# Patient Record
Sex: MALE | URBAN - METROPOLITAN AREA
[De-identification: names, ages, dates, MRNs, and addresses within clinical notes are randomized per-mention and may not be internally consistent; named-entity substitution may affect disease eponyms.]

---

## 2022-10-04 ENCOUNTER — OFFICE VISIT (OUTPATIENT)
Dept: URBAN - METROPOLITAN AREA CLINIC 64 | Facility: CLINIC | Age: 62
End: 2022-10-04
Payer: COMMERCIAL

## 2022-10-04 DIAGNOSIS — H40.013 OPEN ANGLE WITH BORDERLINE FINDINGS, LOW RISK, BILATERAL: Primary | ICD-10-CM

## 2022-10-04 DIAGNOSIS — H11.31 CONJUNCTIVAL HEMORRHAGE, RIGHT EYE: ICD-10-CM

## 2022-10-04 PROCEDURE — 99204 OFFICE O/P NEW MOD 45 MIN: CPT | Performed by: STUDENT IN AN ORGANIZED HEALTH CARE EDUCATION/TRAINING PROGRAM

## 2022-10-04 RX ORDER — NEOMYCIN SULFATE, POLYMYXIN B SULFATE AND DEXAMETHASONE 3.5; 10000; 1 MG/G; [USP'U]/G; MG/G
OINTMENT OPHTHALMIC
Qty: 1 | Refills: 0 | Status: ACTIVE
Start: 2022-10-04

## 2022-10-04 ASSESSMENT — INTRAOCULAR PRESSURE
OS: 10
OD: 9

## 2022-10-04 NOTE — IMPRESSION/PLAN
Impression: Open angle with borderline findings, low risk, bilateral: H40.013. Plan: Based on history. Good IOP today. Recommend baseline glaucoma work up. Monitor.  [facility] VF 24-2/RNFL in 6 months

## 2022-10-04 NOTE — IMPRESSION/PLAN
Impression: Conjunctival hemorrhage, right eye: H11.31.  Plan: Blunt digital trauma
-Start Maxitrol QID OD for one week and then stop

## 2023-04-14 ENCOUNTER — OFFICE VISIT (OUTPATIENT)
Dept: URBAN - METROPOLITAN AREA CLINIC 64 | Facility: CLINIC | Age: 63
End: 2023-04-14
Payer: COMMERCIAL

## 2023-04-14 DIAGNOSIS — H25.13 AGE-RELATED NUCLEAR CATARACT, BILATERAL: ICD-10-CM

## 2023-04-14 DIAGNOSIS — H40.013 OPEN ANGLE WITH BORDERLINE FINDINGS, LOW RISK, BILATERAL: Primary | ICD-10-CM

## 2023-04-14 PROCEDURE — 92133 CPTRZD OPH DX IMG PST SGM ON: CPT | Performed by: STUDENT IN AN ORGANIZED HEALTH CARE EDUCATION/TRAINING PROGRAM

## 2023-04-14 PROCEDURE — 99214 OFFICE O/P EST MOD 30 MIN: CPT | Performed by: STUDENT IN AN ORGANIZED HEALTH CARE EDUCATION/TRAINING PROGRAM

## 2023-04-14 PROCEDURE — 92083 EXTENDED VISUAL FIELD XM: CPT | Performed by: STUDENT IN AN ORGANIZED HEALTH CARE EDUCATION/TRAINING PROGRAM

## 2023-04-14 ASSESSMENT — INTRAOCULAR PRESSURE
OD: 13
OS: 13

## 2023-04-14 NOTE — IMPRESSION/PLAN
Impression: Open angle with borderline findings, low risk, bilateral: H40.013. Plan: No indicators of disease at this time. IOP today normal, optic nerve is abberant, RNFL OCT without thinning OU.  HVF corresponds to area of PPA OU
-Recommend follow up in 1 year for RNFL OCT comparison/HVF 24-2 OU

## 2023-04-14 NOTE — IMPRESSION/PLAN
Impression: Age-related nuclear cataract, bilateral: H25.13. Plan: Patient is bothered by glare and doesnt drive at night anymore. He is requesting a cataract evaluation.  Schedule accordingly

## 2023-05-12 ENCOUNTER — PRE-OPERATIVE VISIT (OUTPATIENT)
Dept: URBAN - METROPOLITAN AREA CLINIC 64 | Facility: CLINIC | Age: 63
End: 2023-05-12
Payer: COMMERCIAL

## 2023-05-12 DIAGNOSIS — H40.013 OPEN ANGLE WITH BORDERLINE FINDINGS, LOW RISK, BILATERAL: ICD-10-CM

## 2023-05-12 DIAGNOSIS — H25.13 AGE-RELATED NUCLEAR CATARACT, BILATERAL: ICD-10-CM

## 2023-05-12 DIAGNOSIS — H35.341 MACULAR CYST, HOLE, OR PSEUDOHOLE, RIGHT EYE: Primary | ICD-10-CM

## 2023-05-12 DIAGNOSIS — H35.373 PUCKERING OF MACULA, BILATERAL: ICD-10-CM

## 2023-05-12 PROCEDURE — 99214 OFFICE O/P EST MOD 30 MIN: CPT | Performed by: STUDENT IN AN ORGANIZED HEALTH CARE EDUCATION/TRAINING PROGRAM

## 2023-05-12 PROCEDURE — 92136 OPHTHALMIC BIOMETRY: CPT | Performed by: STUDENT IN AN ORGANIZED HEALTH CARE EDUCATION/TRAINING PROGRAM

## 2023-05-12 ASSESSMENT — INTRAOCULAR PRESSURE
OD: 15
OS: 11

## 2023-05-12 ASSESSMENT — VISUAL ACUITY
OD: 20/30
OS: 20/25

## 2023-05-12 NOTE — IMPRESSION/PLAN
Impression: Macular cyst, hole, or pseudohole, right eye: H35.341.  Plan: Macular hole, new changes today
-Refer to Retina for possible PPV/MP
discussed with patient cataract will progress after PPV

## 2023-05-12 NOTE — IMPRESSION/PLAN
Impression: Age-related nuclear cataract, bilateral: H25.13.  Plan: HOld off on CEIOL until retina consult

## 2023-05-12 NOTE — IMPRESSION/PLAN
Impression: Open angle with borderline findings, low risk, bilateral: H40.013.  Plan: No evidence of based on testing, monitor, Nerve is myopic in appearance

## 2023-05-12 NOTE — IMPRESSION/PLAN
Impression: Puckering of macula, bilateral: H35.373. Plan: With macular hold OD, trace OS.  refer to retina

## 2023-05-22 ENCOUNTER — OFFICE VISIT (OUTPATIENT)
Dept: URBAN - METROPOLITAN AREA CLINIC 64 | Facility: CLINIC | Age: 63
End: 2023-05-22
Payer: COMMERCIAL

## 2023-05-22 DIAGNOSIS — H35.341 MACULAR CYST, HOLE, OR PSEUDOHOLE, RIGHT EYE: Primary | ICD-10-CM

## 2023-05-22 PROCEDURE — 99214 OFFICE O/P EST MOD 30 MIN: CPT | Performed by: OPHTHALMOLOGY

## 2023-05-22 PROCEDURE — 92134 CPTRZ OPH DX IMG PST SGM RTA: CPT | Performed by: OPHTHALMOLOGY

## 2023-05-22 PROCEDURE — 92250 FUNDUS PHOTOGRAPHY W/I&R: CPT | Performed by: OPHTHALMOLOGY

## 2023-05-22 ASSESSMENT — INTRAOCULAR PRESSURE
OD: 14
OS: 12

## 2023-05-22 NOTE — IMPRESSION/PLAN
Impression: Macular cyst, hole, or pseudohole, right eye: H35.341. Plan: Advanced traction w/ foveal-schisis and decreased Va, recommend PPV - risks and benefits of surgery discussed including loss of vision, loss of eye, need for add'l surgeries, retinal detachment. No guarantee of visual recovery. Pt elects to proceed. Schedule next available PPV/ILM peel/FGx w/ SF6 in [place].

## 2023-05-31 ENCOUNTER — ADULT PHYSICAL (OUTPATIENT)
Dept: URBAN - METROPOLITAN AREA CLINIC 64 | Facility: CLINIC | Age: 63
End: 2023-05-31
Payer: COMMERCIAL

## 2023-05-31 DIAGNOSIS — Z01.818 ENCOUNTER FOR OTHER PREPROCEDURAL EXAMINATION: Primary | ICD-10-CM

## 2023-05-31 DIAGNOSIS — H35.341 MACULAR CYST, HOLE, OR PSEUDOHOLE, RIGHT EYE: ICD-10-CM

## 2023-05-31 PROCEDURE — 99203 OFFICE O/P NEW LOW 30 MIN: CPT | Performed by: PHYSICIAN ASSISTANT

## 2023-06-16 ENCOUNTER — POST-OPERATIVE VISIT (OUTPATIENT)
Dept: URBAN - METROPOLITAN AREA CLINIC 64 | Facility: LOCATION | Age: 63
End: 2023-06-16
Payer: COMMERCIAL

## 2023-06-16 DIAGNOSIS — Z48.810 ENCOUNTER FOR SURGICAL AFTERCARE FOLLOWING SURGERY ON A SENSE ORGAN: Primary | ICD-10-CM

## 2023-06-16 PROCEDURE — 99024 POSTOP FOLLOW-UP VISIT: CPT

## 2023-06-16 RX ORDER — TOBRAMYCIN 3 MG/ML
0.3 % SOLUTION/ DROPS OPHTHALMIC
Qty: 5 | Refills: 0 | Status: ACTIVE
Start: 2023-06-16

## 2023-06-16 ASSESSMENT — INTRAOCULAR PRESSURE: OD: 15

## 2023-06-16 NOTE — IMPRESSION/PLAN
Impression: S/P Posterior Vitrectomy; Epiretinal Membranectomy; AFX (Air Fluid Gas Exchange) OD - 1 Day. Encounter for surgical aftercare following surgery on a sense organ  Z48.810. Plan: Pt ed. Pt is doing well. RTC as scheduled.

## 2023-07-06 ENCOUNTER — OFFICE VISIT (OUTPATIENT)
Dept: URBAN - METROPOLITAN AREA CLINIC 64 | Facility: LOCATION | Age: 63
End: 2023-07-06
Payer: COMMERCIAL

## 2023-07-06 DIAGNOSIS — H35.341 MACULAR CYST, HOLE, OR PSEUDOHOLE, RIGHT EYE: Primary | ICD-10-CM

## 2023-07-06 DIAGNOSIS — Z48.810 ENCOUNTER FOR SURGICAL AFTERCARE FOLLOWING SURGERY ON A SENSE ORGAN: ICD-10-CM

## 2023-07-06 PROCEDURE — 92250 FUNDUS PHOTOGRAPHY W/I&R: CPT | Performed by: OPHTHALMOLOGY

## 2023-07-06 PROCEDURE — 92134 CPTRZ OPH DX IMG PST SGM RTA: CPT | Performed by: OPHTHALMOLOGY

## 2023-07-06 PROCEDURE — 99024 POSTOP FOLLOW-UP VISIT: CPT | Performed by: OPHTHALMOLOGY

## 2023-07-06 ASSESSMENT — INTRAOCULAR PRESSURE
OS: 16
OD: 20

## 2023-07-06 NOTE — IMPRESSION/PLAN
Impression: S/P Posterior Vitrectomy; Epiretinal Membranectomy; AFX (Air Fluid Gas Exchange) OD - 3 weeks. Encounter for surgical aftercare following surgery on a sense organ  Z48.810. Plan: Doing great, residual mild foveal schisis which should improve but EMM resolved. RD precautions. RTC 3 months DFEx/OCT mac/FP.

## 2023-10-09 ENCOUNTER — OFFICE VISIT (OUTPATIENT)
Dept: URBAN - METROPOLITAN AREA CLINIC 64 | Facility: LOCATION | Age: 63
End: 2023-10-09
Payer: COMMERCIAL

## 2023-10-09 DIAGNOSIS — H35.341 MACULAR CYST, HOLE, OR PSEUDOHOLE, RIGHT EYE: Primary | ICD-10-CM

## 2023-10-09 PROCEDURE — 92250 FUNDUS PHOTOGRAPHY W/I&R: CPT | Performed by: OPHTHALMOLOGY

## 2023-10-09 PROCEDURE — 92134 CPTRZ OPH DX IMG PST SGM RTA: CPT | Performed by: OPHTHALMOLOGY

## 2023-10-09 PROCEDURE — 99214 OFFICE O/P EST MOD 30 MIN: CPT | Performed by: OPHTHALMOLOGY

## 2023-10-09 ASSESSMENT — INTRAOCULAR PRESSURE
OS: 14
OD: 13

## 2023-10-16 ENCOUNTER — OFFICE VISIT (OUTPATIENT)
Dept: URBAN - METROPOLITAN AREA CLINIC 64 | Facility: LOCATION | Age: 63
End: 2023-10-16
Payer: COMMERCIAL

## 2023-10-16 DIAGNOSIS — H35.341 MACULAR CYST, HOLE, OR PSEUDOHOLE, RIGHT EYE: ICD-10-CM

## 2023-10-16 DIAGNOSIS — H52.223 REGULAR ASTIGMATISM, BILATERAL: ICD-10-CM

## 2023-10-16 DIAGNOSIS — H25.13 AGE-RELATED NUCLEAR CATARACT, BILATERAL: ICD-10-CM

## 2023-10-16 DIAGNOSIS — H25.813 COMBINED FORMS OF AGE-RELATED CATARACT, BILATERAL: Primary | ICD-10-CM

## 2023-10-16 DIAGNOSIS — H35.372 PUCKERING OF MACULA, LEFT EYE: ICD-10-CM

## 2023-10-16 PROCEDURE — 92136 OPHTHALMIC BIOMETRY: CPT | Performed by: OPHTHALMOLOGY

## 2023-10-16 PROCEDURE — 99214 OFFICE O/P EST MOD 30 MIN: CPT | Performed by: OPHTHALMOLOGY

## 2023-10-16 ASSESSMENT — INTRAOCULAR PRESSURE
OS: 16
OD: 15

## 2023-10-16 ASSESSMENT — VISUAL ACUITY
OD: 20/40
OS: 20/20

## 2023-11-01 ENCOUNTER — ADULT PHYSICAL (OUTPATIENT)
Dept: URBAN - METROPOLITAN AREA CLINIC 64 | Facility: LOCATION | Age: 63
End: 2023-11-01
Payer: COMMERCIAL

## 2023-11-01 DIAGNOSIS — H25.13 AGE-RELATED NUCLEAR CATARACT, BILATERAL: ICD-10-CM

## 2023-11-01 DIAGNOSIS — Z01.818 ENCOUNTER FOR OTHER PREPROCEDURAL EXAMINATION: Primary | ICD-10-CM

## 2023-11-01 PROCEDURE — 99213 OFFICE O/P EST LOW 20 MIN: CPT | Performed by: NURSE PRACTITIONER

## 2023-11-01 RX ORDER — LOSARTAN POTASSIUM 50 MG/1
50 MG TABLET, FILM COATED ORAL
Qty: 0 | Refills: 0 | Status: ACTIVE
Start: 2023-11-01

## 2023-11-07 ENCOUNTER — SURGERY (OUTPATIENT)
Dept: URBAN - METROPOLITAN AREA SURGERY 42 | Facility: LOCATION | Age: 63
End: 2023-11-07
Payer: COMMERCIAL

## 2023-11-07 PROCEDURE — 66984 XCAPSL CTRC RMVL W/O ECP: CPT | Performed by: OPHTHALMOLOGY

## 2023-11-07 PROCEDURE — PR1CP PR1CP: CUSTOM | Performed by: OPHTHALMOLOGY

## 2023-11-07 RX ORDER — DICLOFENAC SODIUM 1 MG/ML
0.1 % SOLUTION/ DROPS OPHTHALMIC
Qty: 5 | Refills: 1 | Status: ACTIVE
Start: 2023-11-07

## 2023-11-08 ENCOUNTER — POST-OPERATIVE VISIT (OUTPATIENT)
Dept: URBAN - METROPOLITAN AREA CLINIC 64 | Facility: LOCATION | Age: 63
End: 2023-11-08
Payer: COMMERCIAL

## 2023-11-08 DIAGNOSIS — Z48.810 ENCOUNTER FOR SURGICAL AFTERCARE FOLLOWING SURGERY ON A SENSE ORGAN: Primary | ICD-10-CM

## 2023-11-08 PROCEDURE — 99024 POSTOP FOLLOW-UP VISIT: CPT

## 2023-11-08 ASSESSMENT — INTRAOCULAR PRESSURE: OD: 15

## 2023-11-28 ENCOUNTER — POST-OPERATIVE VISIT (OUTPATIENT)
Dept: URBAN - METROPOLITAN AREA CLINIC 64 | Facility: LOCATION | Age: 63
End: 2023-11-28
Payer: COMMERCIAL

## 2023-11-28 DIAGNOSIS — Z48.810 ENCOUNTER FOR SURGICAL AFTERCARE FOLLOWING SURGERY ON A SENSE ORGAN: Primary | ICD-10-CM

## 2023-11-28 ASSESSMENT — INTRAOCULAR PRESSURE: OD: 24

## 2023-11-28 ASSESSMENT — VISUAL ACUITY: OD: 20/20

## 2024-01-29 ENCOUNTER — OFFICE VISIT (OUTPATIENT)
Dept: URBAN - METROPOLITAN AREA CLINIC 64 | Facility: LOCATION | Age: 64
End: 2024-01-29
Payer: COMMERCIAL

## 2024-01-29 DIAGNOSIS — H35.372 PUCKERING OF MACULA, LEFT EYE: ICD-10-CM

## 2024-01-29 DIAGNOSIS — H25.812 COMBINED FORMS OF AGE-RELATED CATARACT, LEFT EYE: ICD-10-CM

## 2024-01-29 DIAGNOSIS — H52.31 ANISOMETROPIA: Primary | ICD-10-CM

## 2024-01-29 DIAGNOSIS — H52.222 REGULAR ASTIGMATISM, LEFT EYE: ICD-10-CM

## 2024-01-29 PROCEDURE — 99214 OFFICE O/P EST MOD 30 MIN: CPT | Performed by: OPHTHALMOLOGY

## 2024-01-29 RX ORDER — KETOROLAC TROMETHAMINE 5 MG/ML
0.5 % SOLUTION OPHTHALMIC
Qty: 5 | Refills: 1 | Status: ACTIVE
Start: 2024-01-29

## 2024-01-29 ASSESSMENT — VISUAL ACUITY
OS: 20/20
OD: 20/20

## 2024-01-29 ASSESSMENT — INTRAOCULAR PRESSURE: OS: 14

## 2024-02-14 ENCOUNTER — ADULT PHYSICAL (OUTPATIENT)
Dept: URBAN - METROPOLITAN AREA CLINIC 64 | Facility: LOCATION | Age: 64
End: 2024-02-14
Payer: COMMERCIAL

## 2024-02-14 DIAGNOSIS — H25.13 AGE-RELATED NUCLEAR CATARACT, BILATERAL: ICD-10-CM

## 2024-02-14 DIAGNOSIS — Z01.818 ENCOUNTER FOR OTHER PREPROCEDURAL EXAMINATION: Primary | ICD-10-CM

## 2024-02-14 PROCEDURE — 99213 OFFICE O/P EST LOW 20 MIN: CPT | Performed by: NURSE PRACTITIONER

## 2024-02-14 RX ORDER — LOSARTAN POTASSIUM 50 MG/1
50 MG TABLET, FILM COATED ORAL
Qty: 0 | Refills: 0 | Status: ACTIVE
Start: 2024-02-14

## 2024-02-14 RX ORDER — LOSARTAN POTASSIUM 50 MG/1
50 MG TABLET, FILM COATED ORAL
Qty: 0 | Refills: 0 | Status: INACTIVE
Start: 2024-02-14 | End: 2024-02-14

## 2024-02-27 ENCOUNTER — SURGERY (OUTPATIENT)
Dept: URBAN - METROPOLITAN AREA SURGERY 42 | Facility: LOCATION | Age: 64
End: 2024-02-27
Payer: COMMERCIAL

## 2024-02-27 PROCEDURE — PR1CP PR1CP: CUSTOM | Performed by: OPHTHALMOLOGY

## 2024-02-27 PROCEDURE — 66984 XCAPSL CTRC RMVL W/O ECP: CPT | Performed by: OPHTHALMOLOGY

## 2024-02-27 RX ORDER — PREDNISOLONE ACETATE 10 MG/ML
1 % SUSPENSION/ DROPS OPHTHALMIC
Qty: 1 | Refills: 1 | Status: INACTIVE
Start: 2024-02-27 | End: 2024-02-28

## 2024-02-28 ENCOUNTER — POST-OPERATIVE VISIT (OUTPATIENT)
Dept: URBAN - METROPOLITAN AREA CLINIC 64 | Facility: LOCATION | Age: 64
End: 2024-02-28
Payer: COMMERCIAL

## 2024-02-28 DIAGNOSIS — Z48.810 ENCOUNTER FOR SURGICAL AFTERCARE FOLLOWING SURGERY ON A SENSE ORGAN: Primary | ICD-10-CM

## 2024-02-28 PROCEDURE — 99024 POSTOP FOLLOW-UP VISIT: CPT | Performed by: OPTOMETRIST

## 2024-02-28 ASSESSMENT — INTRAOCULAR PRESSURE: OS: 17

## 2024-03-05 ENCOUNTER — POST-OPERATIVE VISIT (OUTPATIENT)
Dept: URBAN - METROPOLITAN AREA CLINIC 64 | Facility: LOCATION | Age: 64
End: 2024-03-05
Payer: COMMERCIAL

## 2024-03-05 DIAGNOSIS — Z96.1 PRESENCE OF INTRAOCULAR LENS: Primary | ICD-10-CM

## 2024-03-05 ASSESSMENT — INTRAOCULAR PRESSURE
OD: 12
OS: 16

## 2024-03-19 ENCOUNTER — POST-OPERATIVE VISIT (OUTPATIENT)
Dept: URBAN - METROPOLITAN AREA CLINIC 64 | Facility: LOCATION | Age: 64
End: 2024-03-19
Payer: COMMERCIAL

## 2024-03-19 DIAGNOSIS — H52.4 PRESBYOPIA: ICD-10-CM

## 2024-03-19 DIAGNOSIS — Z96.1 PRESENCE OF INTRAOCULAR LENS: Primary | ICD-10-CM

## 2024-03-19 PROCEDURE — 92015 DETERMINE REFRACTIVE STATE: CPT | Performed by: OPTOMETRIST

## 2024-03-19 ASSESSMENT — INTRAOCULAR PRESSURE
OS: 16
OD: 12

## 2024-03-19 ASSESSMENT — KERATOMETRY
OD: 42.48
OS: 43.05

## 2024-07-10 ENCOUNTER — OFFICE VISIT (OUTPATIENT)
Dept: URBAN - METROPOLITAN AREA CLINIC 64 | Facility: LOCATION | Age: 64
End: 2024-07-10
Payer: COMMERCIAL

## 2024-07-10 DIAGNOSIS — H26.493 OTHER SECONDARY CATARACT, BILATERAL: Primary | ICD-10-CM

## 2024-07-10 PROCEDURE — 99213 OFFICE O/P EST LOW 20 MIN: CPT | Performed by: OPTOMETRIST

## 2024-07-10 ASSESSMENT — INTRAOCULAR PRESSURE
OS: 19
OD: 15

## 2024-07-10 ASSESSMENT — KERATOMETRY
OS: 43.08
OD: 42.59

## 2025-07-17 ENCOUNTER — OFFICE VISIT (OUTPATIENT)
Dept: URBAN - METROPOLITAN AREA CLINIC 64 | Facility: LOCATION | Age: 65
End: 2025-07-17
Payer: MEDICARE

## 2025-07-17 DIAGNOSIS — H35.372 PUCKERING OF MACULA, LEFT EYE: ICD-10-CM

## 2025-07-17 DIAGNOSIS — H26.493 OTHER SECONDARY CATARACT, BILATERAL: Primary | ICD-10-CM

## 2025-07-17 DIAGNOSIS — H35.341 MACULAR CYST, HOLE, OR PSEUDOHOLE, RIGHT EYE: ICD-10-CM

## 2025-07-17 PROCEDURE — 92014 COMPRE OPH EXAM EST PT 1/>: CPT | Performed by: OPTOMETRIST

## 2025-07-17 ASSESSMENT — INTRAOCULAR PRESSURE
OS: 14
OD: 16

## 2025-08-14 ENCOUNTER — OFFICE VISIT (OUTPATIENT)
Dept: URBAN - METROPOLITAN AREA CLINIC 64 | Facility: LOCATION | Age: 65
End: 2025-08-14
Payer: MEDICARE

## 2025-08-14 DIAGNOSIS — H40.013 OPEN ANGLE WITH BORDERLINE FINDINGS, LOW RISK, BILATERAL: ICD-10-CM

## 2025-08-14 DIAGNOSIS — Z96.1 PRESENCE OF INTRAOCULAR LENS: ICD-10-CM

## 2025-08-14 DIAGNOSIS — H35.372 PUCKERING OF MACULA, LEFT EYE: Primary | ICD-10-CM

## 2025-08-14 PROCEDURE — 99213 OFFICE O/P EST LOW 20 MIN: CPT | Performed by: OPTOMETRIST

## 2025-08-14 PROCEDURE — 92134 CPTRZ OPH DX IMG PST SGM RTA: CPT | Performed by: OPTOMETRIST

## 2025-08-14 ASSESSMENT — INTRAOCULAR PRESSURE
OD: 15
OS: 13

## 2025-08-14 ASSESSMENT — VISUAL ACUITY
OS: 20/20
OD: 20/20